# Patient Record
Sex: MALE | Race: WHITE | Employment: UNEMPLOYED | URBAN - METROPOLITAN AREA
[De-identification: names, ages, dates, MRNs, and addresses within clinical notes are randomized per-mention and may not be internally consistent; named-entity substitution may affect disease eponyms.]

---

## 2017-01-25 ENCOUNTER — OFFICE VISIT (OUTPATIENT)
Dept: FAMILY MEDICINE CLINIC | Facility: CLINIC | Age: 37
End: 2017-01-25

## 2017-01-25 VITALS
SYSTOLIC BLOOD PRESSURE: 122 MMHG | RESPIRATION RATE: 20 BRPM | BODY MASS INDEX: 31.32 KG/M2 | HEART RATE: 84 BPM | HEIGHT: 65 IN | TEMPERATURE: 97 F | WEIGHT: 188 LBS | DIASTOLIC BLOOD PRESSURE: 80 MMHG

## 2017-01-25 DIAGNOSIS — Z00.00 ENCOUNTER FOR MEDICARE ANNUAL WELLNESS EXAM: Primary | ICD-10-CM

## 2017-01-25 DIAGNOSIS — Z00.00 ROUTINE GENERAL MEDICAL EXAMINATION AT A HEALTH CARE FACILITY: ICD-10-CM

## 2017-01-25 DIAGNOSIS — E78.00 PURE HYPERCHOLESTEROLEMIA: ICD-10-CM

## 2017-01-25 DIAGNOSIS — F32.5 MAJOR DEPRESSIVE DISORDER WITH SINGLE EPISODE, IN REMISSION (HCC): ICD-10-CM

## 2017-01-25 DIAGNOSIS — F84.0 ACTIVE AUTISTIC DISORDER: ICD-10-CM

## 2017-01-25 DIAGNOSIS — E66.01 MORBID OBESITY DUE TO EXCESS CALORIES (HCC): ICD-10-CM

## 2017-01-25 PROCEDURE — 96160 PT-FOCUSED HLTH RISK ASSMT: CPT | Performed by: FAMILY MEDICINE

## 2017-01-25 NOTE — PROGRESS NOTES
HPI:   Jose Chavez is a 39year old male who presents for a MA (Medicare Advantage) [facility] (Once per calendar year). Neither the patient nor his mother have any complaints or concerns today. Denies any recent illnesses or hospitalizations. unspecified viral disease (6/29/2012); and Lumbago (6/29/2012). He  has past surgical history that includes other surgical history (age 7). His family history includes Hypertension in his mother; Other in his father.    SOCIAL HISTORY:   He  reports thyroid: not enlarged, symmetric, no tenderness/mass/nodules   Back:   Symmetric, no curvature, ROM normal, no CVA tenderness   Lungs:   Clear to auscultation bilaterally, respirations unlabored       Heart:  Regular rate and rhythm, S1, S2 normal, no murm take aspirin. We discussed the risks and benefits of aspirin therapy.    Briana Beltran is unable to use daily aspirin therapy For the following reasons:   Patient decided that the risks of aspirin therapy outweigh the benefits and declines aspirin therap No    Memory Problems?: No      Fall/Risk Assessment     Do you have 3 or more medical conditions?: 1-Yes    Have you fallen in the last 12 months?: 0-No    Do you accidently lose urine?: 0-No    Do you have difficulty seeing?: 1-Yes    Do you have any dif results found for this or any previous visit. No flowsheet data found. Fecal Occult Blood Annually No results found for: FOB No flowsheet data found.     Glaucoma Screening      Ophthalmology Visit Annually: Diabetics, FHx Glaucoma, AA>50, > 65

## 2017-01-25 NOTE — PATIENT INSTRUCTIONS
Brenda Tarango's SCREENING SCHEDULE   Tests on this list are recommended by your physician but may not be covered, or covered at this frequency, by your insurer. Please check with your insurance carrier before scheduling to verify coverage.     Oanh Whitman Screen   Covered every 10 years- more often if abnormal There are no preventive care reminders to display for this patient.  Update Health Maintenance if applicable    Flex Sigmoidoscopy Screen  Covered every 5 years No results found for this or any previou performed in visit on 02/13/13  -TETANUS, DIPHTHERIA TOXOIDS AND ACELLULAR PERTUSIS VACCINE (TDAP), >7 YEARS, IM USE    This may be covered with your prescription benefits, but Medicare does not cover unless Medically needed    Zoster (Not covered by Medic risk At routine exams   High cholesterol or triglycerides All men ages 28 and older, and younger men at high risk for coronary artery disease At least every 5 years   HIV All men At routine exams   Obesity All men in this age group At routine exams   [facility] provider PCV13: 1 dose ages 23 to 72 (protects against 13 types of pneumococcal bacteria)  PPSV23: 1 to 2 doses through age 59, or 1 dose at 72 or older (protects against 23 types of pneumococcal bacteria)   Tetanus/diphtheria/pertussis (Td/Tdap) booster A

## 2017-12-28 ENCOUNTER — TELEPHONE (OUTPATIENT)
Dept: FAMILY MEDICINE CLINIC | Facility: CLINIC | Age: 37
End: 2017-12-28

## 2017-12-28 NOTE — TELEPHONE ENCOUNTER
Please assist in scheduling a [employer] MA Supervisit ([address]) after 1/25/18 for 60 minutes with Dr Francisco Arriola.

## 2018-01-10 ENCOUNTER — OFFICE VISIT (OUTPATIENT)
Dept: FAMILY MEDICINE CLINIC | Facility: CLINIC | Age: 38
End: 2018-01-10

## 2018-01-10 VITALS
DIASTOLIC BLOOD PRESSURE: 74 MMHG | HEART RATE: 90 BPM | SYSTOLIC BLOOD PRESSURE: 110 MMHG | BODY MASS INDEX: 30.66 KG/M2 | WEIGHT: 184 LBS | RESPIRATION RATE: 18 BRPM | HEIGHT: 65 IN

## 2018-01-10 DIAGNOSIS — T23.232A: Primary | ICD-10-CM

## 2018-01-10 PROCEDURE — 99213 OFFICE O/P EST LOW 20 MIN: CPT | Performed by: FAMILY MEDICINE

## 2018-01-10 NOTE — PATIENT INSTRUCTIONS
Second-Degree Burn  A burn occurs when skin is exposed to too much heat, sun, or harsh chemicals. A second-degree burn (partial-thickness burn) is deeper than a first-degree burn (superficial burn). It usually causes a blister to form.  The blister may re · Check for signs of infection listed below. · Put any prescribed antibiotic cream or ointment on the wound. · Cover the burn with nonstick gauze.  Then wrap it with the bandage material.  Follow-up care  Follow up with your healthcare provider, or as adv
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
## 2018-03-23 ENCOUNTER — OFFICE VISIT (OUTPATIENT)
Dept: FAMILY MEDICINE CLINIC | Facility: CLINIC | Age: 38
End: 2018-03-23

## 2018-03-23 VITALS
HEART RATE: 80 BPM | DIASTOLIC BLOOD PRESSURE: 80 MMHG | RESPIRATION RATE: 14 BRPM | SYSTOLIC BLOOD PRESSURE: 120 MMHG | WEIGHT: 189 LBS | BODY MASS INDEX: 31.49 KG/M2 | HEIGHT: 65 IN

## 2018-03-23 DIAGNOSIS — E78.00 PURE HYPERCHOLESTEROLEMIA: ICD-10-CM

## 2018-03-23 DIAGNOSIS — Z00.00 ROUTINE GENERAL MEDICAL EXAMINATION AT A HEALTH CARE FACILITY: ICD-10-CM

## 2018-03-23 DIAGNOSIS — F32.5 MAJOR DEPRESSIVE DISORDER WITH SINGLE EPISODE, IN FULL REMISSION (HCC): ICD-10-CM

## 2018-03-23 DIAGNOSIS — F84.0 ACTIVE AUTISTIC DISORDER: ICD-10-CM

## 2018-03-23 DIAGNOSIS — E66.01 MORBID OBESITY (HCC): ICD-10-CM

## 2018-03-23 DIAGNOSIS — Z00.00 ENCOUNTER FOR MEDICARE ANNUAL WELLNESS EXAM: Primary | ICD-10-CM

## 2018-03-23 DIAGNOSIS — E87.1 HYPONATREMIA: ICD-10-CM

## 2018-03-23 PROCEDURE — 96160 PT-FOCUSED HLTH RISK ASSMT: CPT | Performed by: FAMILY MEDICINE

## 2018-03-23 NOTE — PATIENT INSTRUCTIONS
Elver Tarango's SCREENING SCHEDULE   Tests on this list are recommended by your physician but may not be covered, or covered at this frequency, by your insurer. Please check with your insurance carrier before scheduling to verify coverage.     Radha Gutierrez Covered every 10 years- more often if abnormal There are no preventive care reminders to display for this patient.  Update Health Maintenance if applicable    Flex Sigmoidoscopy Screen  Covered every 5 years No results found for this or any previous visit in visit on 02/13/13  -TETANUS, DIPHTHERIA TOXOIDS AND ACELLULAR PERTUSIS VACCINE (TDAP), >7 YEARS, IM USE    This may be covered with your prescription benefits, but Medicare does not cover unless Medically needed    Zoster (Not covered by Medicare Part B

## 2018-03-23 NOTE — PROGRESS NOTES
HPI:   Jeff Capps is a 44year old male who presents for a MA (Medicare Advantage) [facility] (Once per calendar year). Neither the patient nor his mother have any complaints or concerns today. Denies any recent illnesses or hospitalizations. viral disease (6/29/2012); and Unspecified disorder of kidney and ureter. He  has a past surgical history that includes other surgical history (age 7). His family history includes Hypertension in his mother; Other in his father.    SOCIAL HISTORY: and external ear canals, both ears   Nose: Nares normal, septum midline, mucosa normal, no drainage or sinus tenderness   Throat: Lips, mucosa, and tongue normal; teeth and gums normal   Neck: Supple, symmetrical, trachea midline, no adenopathy, thyroid: n Future  -     Lipid Panel [E];  Future  -  Encouraged low fat/low carb diet and exercise  -  Recheck weight at next OV in 6 months    Major depressive disorder with single episode, in remission ([facility] [address]  -  Stable  -  Continue current regimen         Mr. Derrell Marks money/bills: Cannot do without help    Taking medications as prescribed: Able without help    Are you able to afford your medications?: Yes    Hearing Problems?: No     Functional Status     Hearing Problems?: No    Vision Problems? : Yes    Difficulty wal 12/29/2015 99   ----------       Cardiovascular Disease Screening     LDL Annually LDL-CHOLESTEROL (mg/dL (calc))   Date Value   12/29/2015 151 (H)        EKG - w/ Initial Preventative Physical Exam only, or if medically necessary Electrocardiogram date Value   03/16/2011 5.2       No flowsheet data found. Creat/alb ratio  Annually      LDL  Annually LDL-CHOLESTEROL (mg/dL (calc))   Date Value   12/29/2015 151 (H)    No flowsheet data found. Dilated Eye exam  Annually No flowsheet data found.   No f

## 2018-05-12 ENCOUNTER — HOSPITAL ENCOUNTER (EMERGENCY)
Facility: HOSPITAL | Age: 38
Discharge: HOME | End: 2018-05-12
Attending: EMERGENCY MEDICINE
Payer: MEDICARE

## 2018-05-12 VITALS
DIASTOLIC BLOOD PRESSURE: 80 MMHG | TEMPERATURE: 98 F | HEART RATE: 70 BPM | SYSTOLIC BLOOD PRESSURE: 127 MMHG | HEIGHT: 65 IN | RESPIRATION RATE: 17 BRPM | WEIGHT: 187 LBS | OXYGEN SATURATION: 96 % | BODY MASS INDEX: 31.16 KG/M2

## 2018-05-12 DIAGNOSIS — S01.81XA FACIAL LACERATION, INITIAL ENCOUNTER: ICD-10-CM

## 2018-05-12 DIAGNOSIS — S01.01XA SCALP LACERATION, INITIAL ENCOUNTER: Primary | ICD-10-CM

## 2018-05-12 PROCEDURE — 12015 RPR F/E/E/N/L/M 7.6-12.5 CM: CPT

## 2018-05-12 PROCEDURE — 99283 EMERGENCY DEPT VISIT LOW MDM: CPT

## 2018-05-12 NOTE — ED NOTES
Asepsis performed by Aubrie SAMUEL prior to suturing and dressing applied by LIZZIE Hazel afterward. Discharge instructions given by mom.  Patient discharged in care of mom per MD.

## 2018-05-12 NOTE — ED PROVIDER NOTES
Patient Seen in: [facility] Emergency Department    History   Patient presents with:  Laceration Abrasion (integumentary)    Stated Complaint: Fall- laceration to head    HPI    Patient slipped and fell, hit the forehead and scalp against a corner o swelling, deformity   Except for large laceration extremities: No cyanosis, no clubbing, no edema   Musculoskeletal: No tenderness, swelling, deformity   Skin: No significant lesions   Neuro: Grossly intact to patient's baseline, cranial nerves II through
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
## 2018-05-18 ENCOUNTER — OFFICE VISIT (OUTPATIENT)
Dept: FAMILY MEDICINE CLINIC | Facility: CLINIC | Age: 38
End: 2018-05-18

## 2018-05-18 VITALS
SYSTOLIC BLOOD PRESSURE: 132 MMHG | OXYGEN SATURATION: 98 % | RESPIRATION RATE: 18 BRPM | BODY MASS INDEX: 31.82 KG/M2 | DIASTOLIC BLOOD PRESSURE: 76 MMHG | WEIGHT: 191 LBS | HEIGHT: 65 IN | HEART RATE: 83 BPM

## 2018-05-18 DIAGNOSIS — Z48.02 ENCOUNTER FOR REMOVAL OF SUTURES: Primary | ICD-10-CM

## 2018-05-18 NOTE — PROGRESS NOTES
Patient here for staple and suture removal on scalp, it is day 6. Edges still not completely healed and first 3 staples with slightly open area. Will hold off for 3 more days and then remove on day 9 or 10.   Large amount of neosporin to area maybe soft

## 2018-05-22 ENCOUNTER — OFFICE VISIT (OUTPATIENT)
Dept: FAMILY MEDICINE CLINIC | Facility: CLINIC | Age: 38
End: 2018-05-22

## 2018-05-22 DIAGNOSIS — Z48.02 ENCOUNTER FOR REMOVAL OF SUTURES: ICD-10-CM

## 2018-05-22 DIAGNOSIS — Z48.02: Primary | ICD-10-CM

## 2018-05-22 NOTE — PATIENT INSTRUCTIONS
Monitor for any drainage  May use cream as directed by PCP / ER instructions  Follow-up with PCP if any problems    No swimming until completely healed.            Suture or Staple Removal  You were seen today for a suture or staple removal. Your wound is h © 3796-0827 [employer] [address] [facility] [place]. All rights reserved. This information is not intended as a substitute for professional medical care. Always follow your healthcare professional's instructions.

## 2018-05-22 NOTE — PROGRESS NOTES
Chief complaint: Suture/staple removal to right forehead / scalp. The staples and sutures were placed on 5/12/2018 for a head laceration due to a fall. Patient was here on 5/18/2018 but the staples / sutures weren't ready to be removed.      HPI: Katelynn

## 2019-01-17 ENCOUNTER — TELEPHONE (OUTPATIENT)
Dept: FAMILY MEDICINE CLINIC | Facility: CLINIC | Age: 39
End: 2019-01-17

## 2019-04-22 ENCOUNTER — OFFICE VISIT (OUTPATIENT)
Dept: FAMILY MEDICINE CLINIC | Facility: CLINIC | Age: 39
End: 2019-04-22
Payer: MEDICARE

## 2019-04-22 ENCOUNTER — LAB ENCOUNTER (OUTPATIENT)
Dept: LAB | Age: 39
End: 2019-04-22
Attending: Other
Payer: MEDICARE

## 2019-04-22 VITALS
RESPIRATION RATE: 12 BRPM | HEIGHT: 65 IN | SYSTOLIC BLOOD PRESSURE: 128 MMHG | BODY MASS INDEX: 32.15 KG/M2 | WEIGHT: 193 LBS | HEART RATE: 74 BPM | DIASTOLIC BLOOD PRESSURE: 78 MMHG | TEMPERATURE: 98 F

## 2019-04-22 DIAGNOSIS — F32.5 MAJOR DEPRESSIVE DISORDER WITH SINGLE EPISODE, IN FULL REMISSION (HCC): ICD-10-CM

## 2019-04-22 DIAGNOSIS — Q21.1 ASD (ATRIAL SEPTAL DEFECT): Primary | ICD-10-CM

## 2019-04-22 DIAGNOSIS — E78.00 PURE HYPERCHOLESTEROLEMIA: ICD-10-CM

## 2019-04-22 DIAGNOSIS — F84.0 ACTIVE AUTISTIC DISORDER: ICD-10-CM

## 2019-04-22 DIAGNOSIS — Z00.00 ENCOUNTER FOR MEDICARE ANNUAL WELLNESS EXAM: Primary | ICD-10-CM

## 2019-04-22 DIAGNOSIS — E66.01 MORBID OBESITY (HCC): ICD-10-CM

## 2019-04-22 DIAGNOSIS — F34.81 DMDD (DISRUPTIVE MOOD DYSREGULATION DISORDER) (HCC): ICD-10-CM

## 2019-04-22 PROCEDURE — 84443 ASSAY THYROID STIM HORMONE: CPT

## 2019-04-22 PROCEDURE — 84146 ASSAY OF PROLACTIN: CPT

## 2019-04-22 PROCEDURE — 96160 PT-FOCUSED HLTH RISK ASSMT: CPT | Performed by: FAMILY MEDICINE

## 2019-04-22 PROCEDURE — 84439 ASSAY OF FREE THYROXINE: CPT

## 2019-04-22 PROCEDURE — 82306 VITAMIN D 25 HYDROXY: CPT

## 2019-04-22 PROCEDURE — 82607 VITAMIN B-12: CPT

## 2019-04-22 PROCEDURE — 83036 HEMOGLOBIN GLYCOSYLATED A1C: CPT

## 2019-04-22 PROCEDURE — 36415 COLL VENOUS BLD VENIPUNCTURE: CPT

## 2019-04-22 PROCEDURE — 82746 ASSAY OF FOLIC ACID SERUM: CPT

## 2019-04-22 PROCEDURE — 80061 LIPID PANEL: CPT

## 2019-04-22 PROCEDURE — 85025 COMPLETE CBC W/AUTO DIFF WBC: CPT

## 2019-04-22 PROCEDURE — G0438 PPPS, INITIAL VISIT: HCPCS | Performed by: FAMILY MEDICINE

## 2019-04-22 PROCEDURE — 80053 COMPREHEN METABOLIC PANEL: CPT

## 2019-04-22 PROCEDURE — 99395 PREV VISIT EST AGE 18-39: CPT | Performed by: FAMILY MEDICINE

## 2019-04-22 NOTE — PROGRESS NOTES
HPI:   Jesse Street is a 43year old male who presents for a MA (Medicare Advantage) [facility] (Once per calendar year). Neither the patient nor his mother have any complaints or concerns today. Denies any recent illnesses or hospitalizations. examination for unspecified viral disease (6/29/2012), and Unspecified disorder of kidney and ureter. He  has a past surgical history that includes other surgical history (age 7). His family history includes Hypertension in his mother;  Other in his conjunctiva/corneas clear, EOM's intact, both eyes   Ears:  Normal TM's and external ear canals, both ears   Nose: Nares normal, septum midline, mucosa normal, no drainage or sinus tenderness   Throat: Lips, mucosa, and tongue normal; teeth and gums normal calories ([address]  -  Encouraged low fat/low carb diet and exercise  -  Recheck weight at next OV in 6 months    Major depressive disorder with single episode, in remission ([address]  -  Stable  -  Continue current regimen         Mr. Rivera Paola does not currently ta help    Taking medications as prescribed: Need some help    Are you able to afford your medications?: Yes    Hearing Problems?: No     Functional Status     Hearing Problems?: No    Vision Problems? : Yes(GLASSES)    Difficulty walking?: No    Difficulty d display for this patient. Update Health Maintenance if applicable    Flex Sigmoidoscopy Screen every 10 years No results found for this or any previous visit. No flowsheet data found.      Fecal Occult Blood Annually No results found for: FOB No flowsheet d

## 2019-04-22 NOTE — PATIENT INSTRUCTIONS
Niles Najjar Betzel's SCREENING SCHEDULE   Tests on this list are recommended by your physician but may not be covered, or covered at this frequency, by your insurer. Please check with your insurance carrier before scheduling to verify coverage.     Gris Killian years- more often if abnormal There are no preventive care reminders to display for this patient. Update Health Maintenance if applicable    Flex Sigmoidoscopy Screen  Covered every 5 years No results found for this or any previous visit.  No flowsheet data • TETANUS, DIPHTHERIA TOXOIDS AND ACELLULAR PERTUSIS VACCINE (TDAP), >7 YEARS, IM USE    This may be covered with your prescription benefits, but Medicare does not cover unless Medically needed    Zoster (Not covered by Medicare Part B) No orders found f

## 2019-05-08 ENCOUNTER — TELEPHONE (OUTPATIENT)
Dept: FAMILY MEDICINE CLINIC | Facility: CLINIC | Age: 39
End: 2019-05-08

## 2019-05-08 NOTE — TELEPHONE ENCOUNTER
Natan High from Dr. Madison Messer office called asking us to fax over lab results that Dr. Domenica Mauro ordered (they only have access to Epic in the hospital and she is calling from Dr. Madison Messer private practice). Per Joey Murdock to fax.   Results faxed to [phone]

## 2019-10-04 ENCOUNTER — MED REC SCAN ONLY (OUTPATIENT)
Dept: FAMILY MEDICINE CLINIC | Facility: CLINIC | Age: 39
End: 2019-10-04

## 2020-01-16 ENCOUNTER — TELEPHONE (OUTPATIENT)
Dept: FAMILY MEDICINE CLINIC | Facility: CLINIC | Age: 40
End: 2020-01-16

## 2020-01-16 NOTE — TELEPHONE ENCOUNTER
Please call pt to schedule their annual MA supervisit. Please let Buster Gemma know the date once it is schedule.   Thanks

## 2020-01-27 NOTE — TELEPHONE ENCOUNTER
Left voicemail for patient to call back to schedule this appointment. Mailed unable to reach letter.

## 2020-06-22 ENCOUNTER — MED REC SCAN ONLY (OUTPATIENT)
Dept: FAMILY MEDICINE CLINIC | Facility: CLINIC | Age: 40
End: 2020-06-22

## 2020-07-16 ENCOUNTER — MED REC SCAN ONLY (OUTPATIENT)
Dept: FAMILY MEDICINE CLINIC | Facility: CLINIC | Age: 40
End: 2020-07-16

## 2020-10-09 ENCOUNTER — MED REC SCAN ONLY (OUTPATIENT)
Dept: FAMILY MEDICINE CLINIC | Facility: CLINIC | Age: 40
End: 2020-10-09

## 2021-01-26 ENCOUNTER — TELEPHONE (OUTPATIENT)
Dept: FAMILY MEDICINE CLINIC | Facility: CLINIC | Age: 41
End: 2021-01-26

## 2021-05-18 ENCOUNTER — TELEPHONE (OUTPATIENT)
Dept: CASE MANAGEMENT | Age: 41
End: 2021-05-18

## 2021-05-18 NOTE — TELEPHONE ENCOUNTER
Mother Esteban Alcantar informed Pt is eligible for 2021 Medicare Advantage Supervisit, requested a call back this afternoon.

## 2021-05-27 ENCOUNTER — OFFICE VISIT (OUTPATIENT)
Dept: FAMILY MEDICINE CLINIC | Facility: CLINIC | Age: 41
End: 2021-05-27
Payer: MEDICARE

## 2021-05-27 VITALS
WEIGHT: 173 LBS | SYSTOLIC BLOOD PRESSURE: 118 MMHG | BODY MASS INDEX: 28.82 KG/M2 | DIASTOLIC BLOOD PRESSURE: 76 MMHG | HEART RATE: 68 BPM | RESPIRATION RATE: 18 BRPM | HEIGHT: 65 IN

## 2021-05-27 DIAGNOSIS — F84.0 ACTIVE AUTISTIC DISORDER: ICD-10-CM

## 2021-05-27 DIAGNOSIS — Z12.5 PROSTATE CANCER SCREENING: ICD-10-CM

## 2021-05-27 DIAGNOSIS — F34.81 DMDD (DISRUPTIVE MOOD DYSREGULATION DISORDER) (HCC): ICD-10-CM

## 2021-05-27 DIAGNOSIS — Z00.00 ENCOUNTER FOR MEDICARE ANNUAL WELLNESS EXAM: Primary | ICD-10-CM

## 2021-05-27 DIAGNOSIS — E78.00 PURE HYPERCHOLESTEROLEMIA: ICD-10-CM

## 2021-05-27 PROCEDURE — 3074F SYST BP LT 130 MM HG: CPT | Performed by: FAMILY MEDICINE

## 2021-05-27 PROCEDURE — G0439 PPPS, SUBSEQ VISIT: HCPCS | Performed by: FAMILY MEDICINE

## 2021-05-27 PROCEDURE — 3078F DIAST BP <80 MM HG: CPT | Performed by: FAMILY MEDICINE

## 2021-05-27 PROCEDURE — 96160 PT-FOCUSED HLTH RISK ASSMT: CPT | Performed by: FAMILY MEDICINE

## 2021-05-27 PROCEDURE — 3008F BODY MASS INDEX DOCD: CPT | Performed by: FAMILY MEDICINE

## 2021-05-27 PROCEDURE — 99396 PREV VISIT EST AGE 40-64: CPT | Performed by: FAMILY MEDICINE

## 2021-05-27 NOTE — PATIENT INSTRUCTIONS
Prevention Guidelines, Men Ages 36 to 52  Screening tests and vaccines are an important part of managing your health. A screening test is done to find possible disorders or diseases in people who don't have any symptoms.  The goal is to find a disease ear talk with your healthcare provider At routine exams   Tuberculosis Men at increased risk for infection – talk with your healthcare provider Check with your healthcare provider   Vision All men in this age group Every 2 to 4 years if no risk factors for eye then at routine exams   Sexually transmitted infection prevention Men at increased risk for infection – talk with your healthcare provider At routine exams   Use of daily aspirin Men ages 39 to 78 at risk for cardiovascular health problems At routine exams Cholesterol (mg/dL)   Date Value   04/22/2019 137 (H)     LDL-CHOLESTEROL (mg/dL (calc))   Date Value   12/29/2015 151 (H)     Cholesterol, Total (mg/dL)   Date Value   04/22/2019 200 (H)     CHOLESTEROL, TOTAL (mg/dL)   Date Value   12/29/2015 216 (H) separately when covered E/M service is provided on same date    Immunizations      Influenza  Covered Annually No orders found for this or any previous visit.  Please get every year    Pneumococcal 13 (Prevnar)  Covered Once after 65 No orders found for [facility] has information from the [address] [place] [address] regarding Advance Directives.

## 2021-05-27 NOTE — PROGRESS NOTES
HPI:   Trupti Acosta is a 42year old male who presents for a MA (Medicare Advantage) [facility] (Once per calendar year). Neither the patient nor his mother have any complaints or concerns today. Denies any recent illnesses or hospitalizations. unspecified viral disease (6/29/2012), and Unspecified disorder of kidney and ureter. He  has a past surgical history that includes other surgical history (age 7). His family history includes Hypertension in his mother; Other in his father.    SOCIAL tongue normal; teeth and gums normal   Neck: Supple, symmetrical, trachea midline, no adenopathy, thyroid: not enlarged, symmetric, no tenderness/mass/nodules   Back:   Symmetric, no curvature, ROM normal, no CVA tenderness   Lungs:   Clear to auscultation aspirin. We discussed the risks and benefits of aspirin therapy.    Jesse Street is unable to use daily aspirin therapy For the following reasons:   Patient decided that the risks of aspirin therapy outweigh the benefits and declines aspirin therapy No    Problems with daily activities? : No    Memory Problems?: No      Fall/Risk Assessment          Have you fallen in the last 12 months?: 0-No                                        Fall/Risk Scorin          Depression Screening (PHQ-2/PHQ-9): Over Ophthalmology Visit Annually: Diabetics, FHx Glaucoma, AA>50, > 65 No flowsheet data found. Prostate Cancer Screening      PSA  Annually There are no preventive care reminders to display for this patient.   Update Health Maintenance if applicable

## 2021-06-22 ENCOUNTER — LAB ENCOUNTER (OUTPATIENT)
Dept: LAB | Age: 41
End: 2021-06-22
Attending: FAMILY MEDICINE
Payer: MEDICARE

## 2021-06-22 DIAGNOSIS — Z12.5 PROSTATE CANCER SCREENING: ICD-10-CM

## 2021-06-22 DIAGNOSIS — E78.00 PURE HYPERCHOLESTEROLEMIA: ICD-10-CM

## 2021-06-22 PROCEDURE — 80053 COMPREHEN METABOLIC PANEL: CPT

## 2021-06-22 PROCEDURE — 85025 COMPLETE CBC W/AUTO DIFF WBC: CPT

## 2021-06-22 PROCEDURE — 80061 LIPID PANEL: CPT

## 2021-06-22 PROCEDURE — 36415 COLL VENOUS BLD VENIPUNCTURE: CPT

## 2022-01-05 ENCOUNTER — TELEPHONE (OUTPATIENT)
Dept: FAMILY MEDICINE CLINIC | Facility: CLINIC | Age: 42
End: 2022-01-05

## 2022-01-05 NOTE — TELEPHONE ENCOUNTER
Mother Cheikh Callejas on HIPPA informed patient  is eligible for 2022 Medicare Advantage Supervisit, states will call back to schedule.

## 2022-06-16 ENCOUNTER — TELEPHONE (OUTPATIENT)
Dept: FAMILY MEDICINE CLINIC | Facility: CLINIC | Age: 42
End: 2022-06-16

## 2023-05-11 ENCOUNTER — TELEPHONE (OUTPATIENT)
Dept: FAMILY MEDICINE CLINIC | Facility: CLINIC | Age: 43
End: 2023-05-11

## 2023-05-18 ENCOUNTER — PATIENT OUTREACH (OUTPATIENT)
Dept: CASE MANAGEMENT | Age: 43
End: 2023-05-18

## 2023-05-18 NOTE — PROCEDURES
The office order for PCP removal request is Approved and finalized on May 18, 2023.     Thanks,  [facility] [employer]

## 2023-10-05 ENCOUNTER — TELEPHONE (OUTPATIENT)
Dept: FAMILY MEDICINE CLINIC | Facility: CLINIC | Age: 43
End: 2023-10-05

## 2023-10-05 NOTE — TELEPHONE ENCOUNTER
Medical Records Request received from [facility]) for [facility] RECORDS. Release original sent to [employer] on 10/5/23. Copy sent to scanning.

## 2024-01-22 ENCOUNTER — MED REC SCAN ONLY (OUTPATIENT)
Dept: FAMILY MEDICINE CLINIC | Facility: CLINIC | Age: 44
End: 2024-01-22

## (undated) NOTE — MR AVS SNAPSHOT
After Visit Summary   1/25/2017    Jeff Capps    MRN: [id number]           Visit Information        Provider Department Dept Phone    1/25/2017 10:00 AM KAILYN STERLING, DO [facility] [address] [phone]      Your Vitals Were     BP Pulse Temp Fasting Blood Sugar (FSB)   Patient must be diagnosed with one of the following:   • Hypertension   • Dyslipidemia   • Obesity (BMI ³30 kg/m2)   • Previous elevated impaired FBS or GTT   … or any two of the following:   • Overweight (BMI ³25 but <30)   • Glaucoma Screening      Ophthalmology Visit   Covered annually for Diabetics, people with Glaucoma family history,   Americans over age 48   Americans over age 72 No flowsheet data found.  OK to schedule if you are in this risk group, make butler [employer]no. org/publications/Documents/personal_dec. pdf  An information packet, including necessary form from the [employer] website. http://www. idph.state. [place]/public/books/advin.htm  A link to the [employer] healthcare provider Check with your healthcare provider   Vision All men in this age group Every 5 to 8 years if no risk factors for eye disease   Vaccines1 Who needs it How often   Chickenpox (varicella) All men in this age group who have no record of th Diet and exercise Overweight or obese people When diagnosed, and then at routine exams   Use of tobacco and the health affects it can cause All men in this age group Every visit   Sexually transmitted infection prevention Men who are sexually active At rou Create a [employer] password. You can change your password at any time. Choose a Security Question and enter your Answer and click Next. This can be used at a later time if you forget your password. Enter your e-mail address.  You will receive e-mail no

## (undated) NOTE — ED AVS SNAPSHOT
Trupti Acosta   MRN: [id number]    Department:  [facility] Emergency Department   Date of Visit:  5/12/2018           Disclosure     Insurance plans vary and the physician(s) referred by the ER may not be covered by your plan.  Please contact yo tell this physician (or your personal doctor if your instructions are to return to your personal doctor) about any new or lasting problems. The primary care or specialist physician will see patients referred from the [facility] Emergency Department.  Dougie Briceño